# Patient Record
Sex: FEMALE | Race: WHITE | NOT HISPANIC OR LATINO | Employment: UNEMPLOYED | URBAN - METROPOLITAN AREA
[De-identification: names, ages, dates, MRNs, and addresses within clinical notes are randomized per-mention and may not be internally consistent; named-entity substitution may affect disease eponyms.]

---

## 2017-05-24 ENCOUNTER — OFFICE VISIT (OUTPATIENT)
Dept: OBSTETRICS AND GYNECOLOGY | Facility: CLINIC | Age: 52
End: 2017-05-24
Payer: MEDICAID

## 2017-05-24 VITALS
HEIGHT: 72 IN | WEIGHT: 284 LBS | DIASTOLIC BLOOD PRESSURE: 80 MMHG | BODY MASS INDEX: 38.47 KG/M2 | SYSTOLIC BLOOD PRESSURE: 126 MMHG

## 2017-05-24 DIAGNOSIS — N63.10 BREAST MASS, RIGHT: ICD-10-CM

## 2017-05-24 DIAGNOSIS — Z00.00 PREVENTATIVE HEALTH CARE: ICD-10-CM

## 2017-05-24 DIAGNOSIS — Z01.419 ENCOUNTER FOR GYNECOLOGICAL EXAMINATION WITHOUT ABNORMAL FINDING: Primary | ICD-10-CM

## 2017-05-24 PROCEDURE — 99386 PREV VISIT NEW AGE 40-64: CPT | Mod: S$PBB,,, | Performed by: NURSE PRACTITIONER

## 2017-05-24 PROCEDURE — 99999 PR PBB SHADOW E&M-EST. PATIENT-LVL II: CPT | Mod: PBBFAC,,, | Performed by: NURSE PRACTITIONER

## 2017-05-24 PROCEDURE — 99212 OFFICE O/P EST SF 10 MIN: CPT | Mod: PBBFAC | Performed by: NURSE PRACTITIONER

## 2017-05-24 PROCEDURE — 88175 CYTOPATH C/V AUTO FLUID REDO: CPT

## 2017-05-24 RX ORDER — PAROXETINE 10 MG/1
10 TABLET, FILM COATED ORAL
COMMUNITY
Start: 2017-05-15 | End: 2017-05-29

## 2017-05-24 NOTE — PROGRESS NOTES
"CC: Well woman exam    Glendy Veras is a 51 y.o. female  presents for well woman exam. Patient reports cycles were very 26-28 days , heavy. LMP was 03/15/2017. UPT negative. Last pap exam and mammogram were several years ago, normal. Patient reports " finding a right breast mass last year, but did not proceed to provider". No  Nipple discharge or h/o breast cancer in family.    History reviewed. No pertinent past medical history.  Past Surgical History:   Procedure Laterality Date     SECTION      x 2     OVARIAN CYST REMOVAL       Social History     Social History    Marital status:      Spouse name: N/A    Number of children: N/A    Years of education: N/A     Occupational History    Not on file.     Social History Main Topics    Smoking status: Former Smoker    Smokeless tobacco: Not on file    Alcohol use No    Drug use: No    Sexual activity: Yes     Other Topics Concern    Not on file     Social History Narrative    No narrative on file     Family History   Problem Relation Age of Onset    Colon cancer Paternal Grandmother     Colon cancer Maternal Grandfather     Breast cancer Neg Hx     Ovarian cancer Neg Hx      OB History      Para Term  AB Living    3 2 2  1 2    SAB TAB Ectopic Multiple Live Births    1              /80   Ht 6' (1.829 m)   Wt 128.8 kg (284 lb)   BMI 38.52 kg/m²       ROS:  GENERAL: Denies weight gain or weight loss. Feeling well overall.   SKIN: Denies rash or lesions.   HEAD: Denies head injury or headache.   NODES: Denies enlarged lymph nodes.   CHEST: Denies chest pain or shortness of breath.   CARDIOVASCULAR: Denies palpitations or left sided chest pain.   ABDOMEN: No abdominal pain, constipation, diarrhea, nausea, vomiting or rectal bleeding.   URINARY: No frequency, dysuria, hematuria, or burning on urination.  REPRODUCTIVE: See HPI.   BREASTS: HPI  HEMATOLOGIC: No easy bruisability or excessive bleeding. "   MUSCULOSKELETAL: Denies joint pain or swelling.   NEUROLOGIC: Denies syncope or weakness.   PSYCHIATRIC: Denies depression, anxiety or mood swings.    PHYSICAL EXAM:  APPEARANCE: Obese female, in no acute distress.  AFFECT: WNL, alert and oriented x 3  SKIN: No acne or hirsutism  NECK: Neck symmetric without masses or thyromegaly  NODES: No inguinal, cervical, axillary, or femoral lymph node enlargement  CHEST: Good respiratory effect  ABDOMEN: Soft.  No tenderness or masses.   BREASTS: Symmetrical, no skin changes.   No nipple discharge bilaterally.Right breast mass, non tender, 6 to 8 o'clock.  PELVIC: Normal external genitalia without lesions.  Normal hair distribution.  Adequate perineal body, normal urethral meatus.  Vagina moist and well rugated without lesions or discharge.  Cervix pink, without lesions, discharge or tenderness.  No significant cystocele or rectocele.  Bimanual exam shows uterus to be normal size, regular, mobile and nontender.  Adnexa without masses or tenderness.    EXTREMITIES: No edema.    1. Encounter for gynecological examination without abnormal finding  Liquid-based pap smear, screening   2. Preventative health care      PLAN:  DX mammogram  Pap exam  Patient was counseled today on A.C.S. Pap guidelines and recommendations for yearly pelvic exams, mammograms and monthly self breast exams; to see her PCP for other health maintenance.

## 2017-05-25 ENCOUNTER — HOSPITAL ENCOUNTER (OUTPATIENT)
Dept: RADIOLOGY | Facility: HOSPITAL | Age: 52
Discharge: HOME | End: 2017-05-25
Attending: NURSE PRACTITIONER
Payer: MEDICAID

## 2017-05-25 DIAGNOSIS — N63.10 BREAST MASS, RIGHT: ICD-10-CM

## 2017-05-25 PROCEDURE — 77062 BREAST TOMOSYNTHESIS BI: CPT | Mod: 26,,, | Performed by: RADIOLOGY

## 2017-05-25 PROCEDURE — 77066 DX MAMMO INCL CAD BI: CPT | Mod: 26,,, | Performed by: RADIOLOGY

## 2017-05-25 PROCEDURE — 77066 DX MAMMO INCL CAD BI: CPT | Mod: TC

## 2017-05-25 PROCEDURE — 76642 ULTRASOUND BREAST LIMITED: CPT | Mod: 26,RT,, | Performed by: RADIOLOGY

## 2017-05-25 PROCEDURE — 76642 ULTRASOUND BREAST LIMITED: CPT | Mod: TC,PO,RT

## 2017-05-26 ENCOUNTER — TELEPHONE (OUTPATIENT)
Dept: HEMATOLOGY/ONCOLOGY | Facility: CLINIC | Age: 52
End: 2017-05-26

## 2017-05-26 ENCOUNTER — TELEPHONE (OUTPATIENT)
Dept: RADIOLOGY | Facility: HOSPITAL | Age: 52
End: 2017-05-26

## 2017-05-26 NOTE — PROGRESS NOTES
Please call patient and set up appt with general surgery. DX mammogram indicated; Density in the right breast is highly suggestive of malignancy. Biopsy  should be considered.I attempted to call her to discuss these results several times.

## 2017-05-26 NOTE — TELEPHONE ENCOUNTER
Breast Care Management Follow-Up:    Date of Mammogram:05/25/17    Mammogram Reason:Palpable abnormality and pain in the right breast    Mammogram Results:and Right U/S - highly suspicious 25mm solid mass in the right breast. Cat 5.      Referrals/Recommendations:Surgery consult        Patient Status:  05/26/17 Left message for pt to call. Results letter and report mailed to pt.  05/29/17 Pt has an appt with Amelia Gallardo NP today.

## 2017-05-26 NOTE — TELEPHONE ENCOUNTER
----- Message from Herminio Chávez sent at 5/26/2017 11:18 AM CDT -----  Contact: pt  Pt is calling nurse staff regarding questions about patient surgery and if pt can be seen today. . Pt call back [phone] thanks

## 2017-05-29 ENCOUNTER — OFFICE VISIT (OUTPATIENT)
Dept: SURGERY | Facility: CLINIC | Age: 52
End: 2017-05-29
Payer: MEDICAID

## 2017-05-29 VITALS
SYSTOLIC BLOOD PRESSURE: 120 MMHG | TEMPERATURE: 98 F | DIASTOLIC BLOOD PRESSURE: 83 MMHG | WEIGHT: 280.19 LBS | HEART RATE: 78 BPM | BODY MASS INDEX: 38 KG/M2

## 2017-05-29 DIAGNOSIS — R59.0 AXILLARY ADENOPATHY: ICD-10-CM

## 2017-05-29 DIAGNOSIS — R92.8 ABNORMAL MAMMOGRAM: ICD-10-CM

## 2017-05-29 DIAGNOSIS — N63.10 BREAST MASS, RIGHT: Primary | ICD-10-CM

## 2017-05-29 PROCEDURE — 99204 OFFICE O/P NEW MOD 45 MIN: CPT | Mod: S$PBB,,, | Performed by: NURSE PRACTITIONER

## 2017-05-29 PROCEDURE — 99213 OFFICE O/P EST LOW 20 MIN: CPT | Mod: PBBFAC | Performed by: NURSE PRACTITIONER

## 2017-05-29 PROCEDURE — 99999 PR PBB SHADOW E&M-EST. PATIENT-LVL III: CPT | Mod: PBBFAC,,, | Performed by: NURSE PRACTITIONER

## 2017-05-29 NOTE — PROGRESS NOTES
Patient ID: Glendy Veras is a 51 y.o. female.    Chief Complaint: Breast Problem and Abnormal Mammogram      HPI: Patient noted right breast mass 2016 - painful and mobile when first noted. Has increased in size over this time. States had a rash on right arm and breast at the same time of the onset of the lump and spontaneously went away.     Review of Systems   Constitutional: Negative.    HENT: Negative.    Eyes: Negative.    Respiratory: Negative.    Cardiovascular: Negative.    Gastrointestinal: Negative.    Endocrine: Negative.    Genitourinary: Negative.    Musculoskeletal: Negative.    Skin: Negative.    Allergic/Immunologic: Negative.    Neurological: Negative.    Hematological: Negative.    Psychiatric/Behavioral: Negative.        Breast: Pt denies any breast pain, nipple discharge, or palpable mass. No prior trauma or bruising. No breast surgeries or abnormalities.    No current outpatient prescriptions on file.     No current facility-administered medications for this visit.        Review of patient's allergies indicates:  No Known Allergies    History reviewed. No pertinent past medical history.    Past Surgical History:   Procedure Laterality Date     SECTION      x 2     OVARIAN CYST REMOVAL         Family History   Problem Relation Age of Onset    Colon cancer Paternal Grandmother     Colon cancer Maternal Grandfather     Breast cancer Neg Hx     Ovarian cancer Neg Hx        Social History     Social History    Marital status:      Spouse name: N/A    Number of children: N/A    Years of education: N/A     Occupational History    Not on file.     Social History Main Topics    Smoking status: Former Smoker    Smokeless tobacco: Not on file    Alcohol use No    Drug use: No    Sexual activity: Yes     Other Topics Concern    Not on file     Social History Narrative    No narrative on file       Vitals:    17 1018   BP: 120/83   Pulse: 78   Temp: 98.1 °F  (36.7 °C)       Physical Exam   Constitutional: She is oriented to person, place, and time. She appears well-developed and well-nourished.   HENT:   Head: Normocephalic and atraumatic.   Right Ear: External ear normal.   Left Ear: External ear normal.   Eyes: Conjunctivae and EOM are normal. Pupils are equal, round, and reactive to light. Right eye exhibits no discharge. Left eye exhibits no discharge. No scleral icterus.   Neck: Normal range of motion. Neck supple. No thyromegaly present.   Cardiovascular: Normal rate and regular rhythm.    Pulmonary/Chest: Effort normal and breath sounds normal. Right breast exhibits mass (4 cm irregular mass at the 9 oclock region) and tenderness. Right breast exhibits no inverted nipple, no nipple discharge and no skin change. Left breast exhibits no inverted nipple, no mass, no nipple discharge, no skin change and no tenderness.       Musculoskeletal: Normal range of motion.   Lymphadenopathy:        Head (right side): No submental, no submandibular, no tonsillar, no preauricular, no posterior auricular and no occipital adenopathy present.        Head (left side): No submental, no submandibular, no tonsillar, no preauricular, no posterior auricular and no occipital adenopathy present.     She has no cervical adenopathy.        Right cervical: No superficial cervical and no posterior cervical adenopathy present.       Left cervical: No superficial cervical and no posterior cervical adenopathy present.     She has no axillary adenopathy.        Right: No supraclavicular adenopathy present.        Left: No supraclavicular adenopathy present.   Neurological: She is alert and oriented to person, place, and time.   Skin: Skin is warm and dry.   Psychiatric: She has a normal mood and affect. Her behavior is normal. Judgment and thought content normal.       Imagin17:  MAMMO DIGITAL DIAGNOSTIC BILAT WITH TOMOSYNTHESIS_CAD  Views: bilateral craniocaudal with tomosynthesis;  bilateral mediolateral  oblique with tomosynthesis; right craniocaudal spot compression; right  mediolateral with tomosynthesis; right mediolateral oblique spot compression    The breasts are heterogeneously dense.  This may lower the sensitivity of  mammography.    There is a high density density measuring 25 millimeters with spiculated  margins and associated architectural distortion seen in the posterior upper  outer quadrant of the right breast located 8 centimeters from the nipple.  This correlates with plapable abnormality and skin marker in place.  No  additional evidence of dominant mass, architectural distortion or  suspicious calcification.  Bilateral benign appearing calcifications are  again demonstrated.  Bilateral benign appearing scattered fibroglandular  nodular densities again noted.  Digital tomosynthesis was performed and used in the interpretation of the  images.  Images were evaluated with a Computer Aided Detection (CAD) system.    RIGHT LIMITED ULTRASOUND FINDINGS:  Targeted imaging performed  demonstrating a large irregularly marginated  hypoechoic mass  measuring 2.1 x 1.3 x 1.6 cm with minimal posterior  shadowing.  This correlates well with the recent mammogram findings.  Impression  Density in the right breast is highly suggestive of malignancy. Biopsy  should be considered. Appropriate action should be taken.    BI-RADS Category 5: Highly Suggestive of Malignancy    Assessment & Plan:  Breast mass, right  -     US Breast Biopsy with Imaging 1st site R; Future; Expected date: 05/29/2017  -     US Breast Right Limited; Future; Expected date: 05/29/2017  -     US Breast Biopsy with Imaging 1st site R; Future; Expected date: 05/29/2017    Abnormal mammogram  -     US Breast Biopsy with Imaging 1st site R; Future; Expected date: 05/29/2017  -     US Breast Right Limited; Future; Expected date: 05/29/2017  -     US Breast Biopsy with Imaging 1st site R; Future; Expected date:  05/29/2017    Axillary adenopathy  -     US Breast Right Limited; Future; Expected date: 05/29/2017  -     US Breast Biopsy with Imaging 1st site R; Future; Expected date: 05/29/2017    Palpable right breast mass for 7 mons enlarging  Recommend ultrasound guided core needle biopsy of the palpable mass since it is larger clinically than on imaging.   Explained to the pt and her  the suspicious nature of the clinical and imaging findings.   Recommend ultrasound guided core biopsy of the right breast palpable mass and an ultrasound of the right axilla to rule out adenopathy and if noted recommend biopsy.  Explained to pt the results usually take about 7 days to get back and we would schedule her to see a surgeon and oncologist on the same day to discuss management of her case together. Explained that treatment may consist of doing neoadjuvant chemotherapy first then surgery. Pt verbalized understanding. Allowed her and her  opportunity to ask questions.

## 2017-06-14 ENCOUNTER — DOCUMENTATION ONLY (OUTPATIENT)
Dept: HEMATOLOGY/ONCOLOGY | Facility: CLINIC | Age: 52
End: 2017-06-14

## 2017-06-14 NOTE — PROGRESS NOTES
Notified pt of core biopsy results of the right breast performed at [facility].    Right axillary ultrasound did not reveal any suspicious axillary node.    Right breast mass at 9 oclock biopsy reveals invasive mammary carcinoma of no special type. Estrogen/progesterone positive and herceptin negative.     Explained results to pt - she has an appt to see Dr. Ribeiro already for 6/19/17 to discuss surgical intervention.     Pt verbalized understanding of the information given. Discussed potential course of events for pt in the next few weeks. Pt verbalized understanding and states her  will be coming with her to the appt on Monday.

## 2017-06-15 ENCOUNTER — TELEPHONE (OUTPATIENT)
Dept: HEMATOLOGY/ONCOLOGY | Facility: CLINIC | Age: 52
End: 2017-06-15

## 2017-06-15 ENCOUNTER — PATIENT MESSAGE (OUTPATIENT)
Dept: SURGERY | Facility: CLINIC | Age: 52
End: 2017-06-15

## 2017-06-15 NOTE — TELEPHONE ENCOUNTER
----- Message from Nakita Carrizales sent at 6/15/2017 10:43 AM CDT -----  Contact: pt   Pt was told by isabella that she could call and  a pathology report,,, pt would like to come by and get report,,, please call pt back at [phone]

## 2017-06-16 ENCOUNTER — TELEPHONE (OUTPATIENT)
Dept: SURGERY | Facility: HOSPITAL | Age: 52
End: 2017-06-16

## 2017-06-19 ENCOUNTER — OFFICE VISIT (OUTPATIENT)
Dept: SURGERY | Facility: CLINIC | Age: 52
End: 2017-06-19
Payer: MEDICAID

## 2017-06-19 VITALS
DIASTOLIC BLOOD PRESSURE: 79 MMHG | TEMPERATURE: 99 F | HEART RATE: 81 BPM | WEIGHT: 281.31 LBS | SYSTOLIC BLOOD PRESSURE: 125 MMHG | BODY MASS INDEX: 38.15 KG/M2

## 2017-06-19 DIAGNOSIS — C50.911 MALIGNANT NEOPLASM OF RIGHT FEMALE BREAST, UNSPECIFIED SITE OF BREAST: Primary | ICD-10-CM

## 2017-06-19 PROCEDURE — 99999 PR PBB SHADOW E&M-EST. PATIENT-LVL II: CPT | Mod: PBBFAC,,, | Performed by: SURGERY

## 2017-06-19 PROCEDURE — 99214 OFFICE O/P EST MOD 30 MIN: CPT | Mod: S$PBB,,, | Performed by: SURGERY

## 2017-06-19 PROCEDURE — 99212 OFFICE O/P EST SF 10 MIN: CPT | Mod: PBBFAC | Performed by: SURGERY

## 2017-06-19 RX ORDER — ACETAMINOPHEN 325 MG/1
325 TABLET ORAL EVERY 6 HOURS PRN
COMMUNITY

## 2017-06-19 NOTE — PROGRESS NOTES
Patient ID: Glendy Veras is a 51 y.o. female.    Right breast cancer    Chief Complaint: Consult      HPI:  Patient noticed a lump in her right breast.  In August of last year.  The mass would come and go.  It became more prominent/enlarged over time.  She was evaluated with mammography and ultrasound and underwent a core biopsy.  Results of this showed a ductal carcinoma.  She presents now to discuss surgical intervention    Patient states that she had breast imaging done in  at [facility].  She is concerned that this tumor/cancer was present on those images.    Unfortunately we do not have the images available for comparison.        Review of Systems   Constitutional: Positive for activity change. Negative for unexpected weight change.   HENT: Negative for hearing loss, rhinorrhea and trouble swallowing.    Eyes: Negative for discharge and visual disturbance.   Respiratory: Negative for chest tightness and wheezing.    Cardiovascular: Negative for chest pain and palpitations.   Gastrointestinal: Negative for blood in stool, constipation, diarrhea and vomiting.   Endocrine: Negative for polydipsia and polyuria.   Genitourinary: Positive for menstrual problem. Negative for difficulty urinating, dysuria and hematuria.   Musculoskeletal: Positive for arthralgias. Negative for joint swelling and neck pain.   Neurological: Positive for weakness. Negative for headaches.   Psychiatric/Behavioral: Positive for dysphoric mood. Negative for confusion.       No current outpatient prescriptions on file.     No current facility-administered medications for this visit.        Review of patient's allergies indicates:  No Known Allergies    No past medical history on file.    Past Surgical History:   Procedure Laterality Date     SECTION      x 2     OVARIAN CYST REMOVAL         Family History   Problem Relation Age of Onset    Colon cancer Paternal Grandmother     Colon cancer Maternal Grandfather      Breast cancer Neg Hx     Ovarian cancer Neg Hx        Social History     Social History    Marital status:      Spouse name: N/A    Number of children: N/A    Years of education: N/A     Occupational History    Not on file.     Social History Main Topics    Smoking status: Former Smoker    Smokeless tobacco: Not on file    Alcohol use No    Drug use: No    Sexual activity: Yes     Other Topics Concern    Not on file     Social History Narrative    No narrative on file       There were no vitals filed for this visit.    Physical Exam   Constitutional: She is oriented to person, place, and time. She appears well-developed and well-nourished.   HENT:   Head: Normocephalic.   Eyes: EOM are normal. Pupils are equal, round, and reactive to light.   Neck: No JVD present. No tracheal deviation present. No thyromegaly present.   Cardiovascular: Normal rate, regular rhythm and normal heart sounds.    Pulmonary/Chest: Breath sounds normal. She has no wheezes.   Abdominal: Soft. Bowel sounds are normal. She exhibits no distension and no mass. There is no hepatosplenomegaly. There is no tenderness. There is no rigidity, no rebound and no guarding.   Musculoskeletal: Normal range of motion. She exhibits no edema.   Lymphadenopathy:     She has no cervical adenopathy.   Neurological: She is oriented to person, place, and time.   Skin: Skin is warm and dry. No rash noted. No erythema.   Bilateral breast exam showed no skin changes, masses, nipple discharges, nor axillary adenopathy on the left.    On the right side is an fullness in the mid lateral breast quadrant.  There are no other masses.  There are no skin changes.  There is no nipple discharge and is no discrete axillary adenopathy but there is a fullness of the lower axilla   Psychiatric: She has a normal mood and affect.     Biopsy reports were reviewed.  Imaging reports, mammogram and ultrasound as well as axillary ultrasound, were reviewed.  The  axillary ultrasound on the right did not show any concerning adenopathy    Assessment & Plan:    infiltrating ductal carcinoma of the right breast with positive estrogen and progesterone receptors.  HER-2/ct was negative.    Right lumpectomy, sentinel node biopsy, axillary dissection only if 3 lymph nodes are positive or extracapsular invasion is present.    I discussed the rationale for lumpectomy as well as sentinel node biopsy.  I discussed the possible need for axillary dissection.  I compared and contrasted lumpectomy with radiation to mastectomy with and without reconstruction.    I explained to the patient that the need for chemotherapy is yet to be determined.  I explained that she would likely need estrogen blockade with either a Arimidex or tamoxifen per her oncologist.  I explained the need for radiation after lumpectomy.    I explained that there is a possibility she would need to return to the operating room for additional surgery once the final pathology results are available      The patient was very concerned about whether this cancer was present on mammograms done in 2012 at [facility].  She had multiple questions about postoperative cosmesis and what could be done if she was unhappy with the cosmetic results.  She was concerned about the possibility of metastatic disease.    All questions were answered.  Approximately 45 minutes was spent with the patient and her       The patient would need lymphoscintigraphy and needle localization prior to surgery

## 2017-06-19 NOTE — PATIENT INSTRUCTIONS
You have a breast cancer that's approximately 2 cm in size.    I would recommend a lumpectomy, sentinel node biopsy and removal of the lymph nodes from the armpit only if medically necessary.    The only reason to do a mastectomy, at this point, is that you would like to avoid radiation treatment.    If you decide that you wish to undergo a mastectomy and are interested in reconstruction please let us know as soon as possible so that we can make arrangements for you to see a plastic surgeon.      Please think about what surgery he would like to do, I would like to see you back next week to schedule surgery.  I do surgery on Tuesdays, Wednesdays and Fridays

## 2018-09-07 ENCOUNTER — TELEPHONE (OUTPATIENT)
Dept: OBSTETRICS AND GYNECOLOGY | Facility: CLINIC | Age: 53
End: 2018-09-07

## 2018-09-07 NOTE — TELEPHONE ENCOUNTER
Spoke to patient, assisted with scheduling annual appointment with Lucero Leblanc NP on 10/2/18. Patient verbalized understanding.

## 2018-09-07 NOTE — TELEPHONE ENCOUNTER
----- Message from Glendy iSngh sent at 9/7/2018  1:13 PM CDT -----  Contact: self [phone]  States that she needs to be worked in for her well woman visit. Pt is established medicaid. Please call back at [phone]//thank you acc

## 2019-02-01 ENCOUNTER — TELEPHONE (OUTPATIENT)
Dept: OBSTETRICS AND GYNECOLOGY | Facility: CLINIC | Age: 54
End: 2019-02-01

## 2019-02-01 NOTE — TELEPHONE ENCOUNTER
----- Message from Calvin Jacinto sent at 2/1/2019  2:02 PM CST -----  Contact: self [phone]  Would like to be worked in for an annual well woman visit with Lucero Leblanc before April.  Please call back at [phone].  Md Analilia

## 2019-02-01 NOTE — TELEPHONE ENCOUNTER
Spoke to patient and scheduled her annual appointment for 02/07/19 at 1:15pm at the [facility] location to see DILLON Barker. Patient verbalized understanding.

## 2019-02-07 ENCOUNTER — TELEPHONE (OUTPATIENT)
Dept: OBSTETRICS AND GYNECOLOGY | Facility: CLINIC | Age: 54
End: 2019-02-07

## 2019-02-07 ENCOUNTER — OFFICE VISIT (OUTPATIENT)
Dept: OBSTETRICS AND GYNECOLOGY | Facility: CLINIC | Age: 54
End: 2019-02-07
Payer: MEDICAID

## 2019-02-07 VITALS
WEIGHT: 287.06 LBS | HEIGHT: 72 IN | DIASTOLIC BLOOD PRESSURE: 72 MMHG | SYSTOLIC BLOOD PRESSURE: 110 MMHG | BODY MASS INDEX: 38.88 KG/M2

## 2019-02-07 DIAGNOSIS — Z00.00 PREVENTATIVE HEALTH CARE: ICD-10-CM

## 2019-02-07 DIAGNOSIS — R10.2 PELVIC PAIN IN FEMALE: Primary | ICD-10-CM

## 2019-02-07 DIAGNOSIS — Z01.419 CERVICAL SMEAR, AS PART OF ROUTINE GYNECOLOGICAL EXAMINATION: ICD-10-CM

## 2019-02-07 PROCEDURE — 99212 OFFICE O/P EST SF 10 MIN: CPT | Mod: PBBFAC | Performed by: NURSE PRACTITIONER

## 2019-02-07 PROCEDURE — 87186 SC STD MICRODIL/AGAR DIL: CPT

## 2019-02-07 PROCEDURE — 99396 PR PREVENTIVE VISIT,EST,40-64: ICD-10-PCS | Mod: S$PBB,,, | Performed by: NURSE PRACTITIONER

## 2019-02-07 PROCEDURE — 87077 CULTURE AEROBIC IDENTIFY: CPT

## 2019-02-07 PROCEDURE — 88175 CYTOPATH C/V AUTO FLUID REDO: CPT

## 2019-02-07 PROCEDURE — 99999 PR PBB SHADOW E&M-EST. PATIENT-LVL II: CPT | Mod: PBBFAC,,, | Performed by: NURSE PRACTITIONER

## 2019-02-07 PROCEDURE — 99999 PR PBB SHADOW E&M-EST. PATIENT-LVL II: ICD-10-PCS | Mod: PBBFAC,,, | Performed by: NURSE PRACTITIONER

## 2019-02-07 PROCEDURE — 99396 PREV VISIT EST AGE 40-64: CPT | Mod: S$PBB,,, | Performed by: NURSE PRACTITIONER

## 2019-02-07 PROCEDURE — 87624 HPV HI-RISK TYP POOLED RSLT: CPT

## 2019-02-07 PROCEDURE — 87086 URINE CULTURE/COLONY COUNT: CPT

## 2019-02-07 PROCEDURE — 87088 URINE BACTERIA CULTURE: CPT

## 2019-02-07 RX ORDER — VENLAFAXINE HYDROCHLORIDE 75 MG/1
CAPSULE, EXTENDED RELEASE ORAL
COMMUNITY
Start: 2019-01-16

## 2019-02-07 RX ORDER — ONDANSETRON 4 MG/1
TABLET, ORALLY DISINTEGRATING ORAL
COMMUNITY
Start: 2018-04-19

## 2019-02-07 RX ORDER — LORAZEPAM 0.5 MG/1
TABLET ORAL
COMMUNITY

## 2019-02-07 RX ORDER — WARFARIN SODIUM 5 MG/1
TABLET ORAL
COMMUNITY
Start: 2018-12-20

## 2019-02-07 RX ORDER — GABAPENTIN 300 MG/1
CAPSULE ORAL 3 TIMES DAILY
COMMUNITY
Start: 2019-02-03

## 2019-02-07 RX ORDER — LORATADINE 10 MG/1
10 TABLET ORAL DAILY
COMMUNITY

## 2019-02-07 NOTE — PROGRESS NOTES
"CC: Well woman exam    Glendy Veras is a 53 y.o. female  presents for well woman exam.  LMP: . No AUB. Patient reports that she has had pelvic pain for 4 months. Urine in clinic indicated leucocytes. No hematuria. Patient reports vaginal dryness, with intercourse. Last pap exam was normal. Patient is s/p oophorectomy. Patient is being followed for stage III breast cancer (OLOL) Patient is concerned that " her oncology is not taking her seriously and wants a second opinion". Patient states that she " drinks 6-7 glasses of water/day and urine has a strong odor".     Past Medical History:   Diagnosis Date    Anemia     Breast cancer      Past Surgical History:   Procedure Laterality Date     SECTION      x 2     OOPHORECTOMY      OVARIAN CYST REMOVAL      TONSILLECTOMY       Social History     Socioeconomic History    Marital status:      Spouse name: Not on file    Number of children: Not on file    Years of education: Not on file    Highest education level: Not on file   Social Needs    Financial resource strain: Not on file    Food insecurity - worry: Not on file    Food insecurity - inability: Not on file    Transportation needs - medical: Not on file    Transportation needs - non-medical: Not on file   Occupational History    Not on file   Tobacco Use    Smoking status: Former Smoker    Smokeless tobacco: Former User   Substance and Sexual Activity    Alcohol use: No    Drug use: No    Sexual activity: Yes     Partners: Male   Other Topics Concern    Not on file   Social History Narrative    Not on file     Family History   Problem Relation Age of Onset    Colon cancer Paternal Grandmother     Colon cancer Maternal Grandfather     Breast cancer Neg Hx     Ovarian cancer Neg Hx      OB History      Para Term  AB Living    3 2 2   1 2    SAB TAB Ectopic Multiple Live Births    1                  /72 (BP Location: Left arm, Patient Position: " "Sitting, BP Method: Medium (Manual))   Ht 6' 1" (1.854 m)   Wt 130.2 kg (287 lb 0.6 oz)   BMI 37.87 kg/m²       ROS:  GENERAL: Denies weight gain or weight loss. Feeling well overall.   SKIN: Denies rash or lesions.   HEAD: Denies head injury or headache.   NODES: Denies enlarged lymph nodes.   CHEST: Denies chest pain or shortness of breath.   CARDIOVASCULAR: Denies palpitations or left sided chest pain.   URINARY: HPI  ABDOMEN:HPI  REPRODUCTIVE: See HPI.   BREASTS: HPI  HEMATOLOGIC: No easy bruisability or excessive bleeding.   MUSCULOSKELETAL: Denies joint pain or swelling.   NEUROLOGIC: Denies syncope or weakness.   PSYCHIATRIC: Denies depression, anxiety or mood swings.    PHYSICAL EXAM:  APPEARANCE: Obese female, in no acute distress.  AFFECT: WNL, alert and oriented x 3  SKIN: No acne or hirsutism  NECK: Neck symmetric without masses or thyromegaly  NODES: No inguinal, cervical, axillary, or femoral lymph node enlargement  CHEST: Good respiratory effect  ABDOMEN: Soft.  No tenderness or masses.  No hepatosplenomegaly.  No hernias.  BREASTS: Deferred  PELVIC: Normal external genitalia without lesions.  Normal hair distribution.  Adequate perineal body, normal urethral meatus.  Vagina atrophy,without lesions or discharge.  Cervix pink, without lesions, discharge or tenderness.  No significant cystocele or rectocele.  Bimanual exam shows uterus to be normal size, regular, mobile and nontender.  Adnexa without masses or tenderness.    EXTREMITIES: No edema.    1. Pelvic pain in female  Urine culture    US Pelvis Complete Non OB   2. Preventative health care  Liquid-based pap smear, screening    HPV High Risk Genotypes, PCR   3. Cervical smear, as part of routine gynecological examination  Liquid-based pap smear, screening    HPV High Risk Genotypes, PCR     PLAN:  Patient will be referred to VIRGILIO Gallardo NP   Referral to Urology  Urine sent for cx  Pap exam  Pelvic ultrasound           "

## 2019-02-07 NOTE — TELEPHONE ENCOUNTER
----- Message from Chantelle Gonzales LPN sent at 2/7/2019  2:27 PM CST -----  For what?  ----- Message -----  From: Dylan Kruse MA  Sent: 2/7/2019   2:09 PM  To: Sami BUTTS Staff    Good Evening,    Lucero Leblanc is wanting this patient to f/u with Amelia. Patient prefers an afternoon appt.

## 2019-02-08 ENCOUNTER — TELEPHONE (OUTPATIENT)
Dept: OBSTETRICS AND GYNECOLOGY | Facility: CLINIC | Age: 54
End: 2019-02-08

## 2019-02-08 NOTE — TELEPHONE ENCOUNTER
Spoke with pt, informed that after speaking with our Urology Dept, they we are referring her to [facility] for further evaluation. Advised that she will receive a phone call from them to schedule an appt. Pt voiced understanding.

## 2019-02-11 LAB — BACTERIA UR CULT: NORMAL

## 2019-02-11 RX ORDER — NITROFURANTOIN 25; 75 MG/1; MG/1
100 CAPSULE ORAL 2 TIMES DAILY
Qty: 14 CAPSULE | Refills: 0 | Status: SHIPPED | OUTPATIENT
Start: 2019-02-11 | End: 2019-02-13

## 2019-02-12 LAB
HPV HR 12 DNA CVX QL NAA+PROBE: POSITIVE
HPV16 AG SPEC QL: NEGATIVE
HPV18 DNA SPEC QL NAA+PROBE: NEGATIVE

## 2019-02-13 ENCOUNTER — TELEPHONE (OUTPATIENT)
Dept: OBSTETRICS AND GYNECOLOGY | Facility: CLINIC | Age: 54
End: 2019-02-13

## 2019-02-13 RX ORDER — SULFAMETHOXAZOLE AND TRIMETHOPRIM 800; 160 MG/1; MG/1
1 TABLET ORAL 2 TIMES DAILY
Qty: 14 TABLET | Refills: 0 | Status: SHIPPED | OUTPATIENT
Start: 2019-02-13 | End: 2019-02-20

## 2019-02-13 NOTE — PROGRESS NOTES
Please call patient and inform her that pap cytology was negative. HPV co testing was positive. She will need to repeat the pap exam in 12 months.

## 2019-02-13 NOTE — TELEPHONE ENCOUNTER
----- Message from Karsten Colunga sent at 2/13/2019 12:45 PM CST -----  Contact: [facility]  Type:  Pharmacy Calling to Clarify an RX    Name of Caller:[facility]  [facility]  Prescription Name:warfarin  What do they need to clarify?:drug to drug interaction with rx that was prescribed today for pt   Best Call Back Number:[phone]  Additional Information: none      Thanks,  Karsten Colunga

## 2019-02-13 NOTE — TELEPHONE ENCOUNTER
Spoke with Adia at pharmacy, she spoke with patient and patient is not currently taking coumadin.  Pharmacy will consult patient.

## 2019-02-13 NOTE — TELEPHONE ENCOUNTER
Pt called stating she was unaware of urine culture results until viewing via [employer]. Prefers calls for any future results. States she is on day 3 of Macrobid but does not feel better, urine is foamy and now having bladder spasms. Please advise.

## 2019-02-13 NOTE — TELEPHONE ENCOUNTER
R/s pelvic ultrasound on 2/20/19 1:15 PM.Prep instructions given. Patient verbalized understanding

## 2019-02-13 NOTE — TELEPHONE ENCOUNTER
----- Message from Lucero Leblanc NP sent at 2/13/2019  3:51 PM CST -----  Please call patient and inform her that pap cytology was negative. HPV co testing was positive. She will need to repeat the pap exam in 12 months.

## 2019-02-13 NOTE — TELEPHONE ENCOUNTER
Spoke with patient  Results given for pap and HPV co test. the patient may need a phone call from you for further explanation.

## 2019-02-13 NOTE — TELEPHONE ENCOUNTER
----- Message from Kassy Zelaya sent at 2/13/2019 10:51 AM CST -----  Patient needs consult with nurse alondra beltran..[phone] (home)

## 2019-02-20 ENCOUNTER — TELEPHONE (OUTPATIENT)
Dept: OBSTETRICS AND GYNECOLOGY | Facility: CLINIC | Age: 54
End: 2019-02-20

## 2019-02-20 ENCOUNTER — APPOINTMENT (OUTPATIENT)
Dept: RADIOLOGY | Facility: HOSPITAL | Age: 54
End: 2019-02-20
Attending: NURSE PRACTITIONER
Payer: MEDICAID

## 2019-02-20 DIAGNOSIS — R10.2 PELVIC PAIN IN FEMALE: ICD-10-CM

## 2019-02-20 PROBLEM — C80.1 NEUROPATHY ASSOCIATED WITH CANCER: Status: ACTIVE | Noted: 2019-02-20

## 2019-02-20 PROBLEM — Z17.0 MALIGNANT NEOPLASM OF UPPER-OUTER QUADRANT OF RIGHT BREAST IN FEMALE, ESTROGEN RECEPTOR POSITIVE: Status: ACTIVE | Noted: 2019-02-20

## 2019-02-20 PROBLEM — F41.9 ANXIETY: Status: ACTIVE | Noted: 2019-02-20

## 2019-02-20 PROBLEM — C50.411 MALIGNANT NEOPLASM OF UPPER-OUTER QUADRANT OF RIGHT BREAST IN FEMALE, ESTROGEN RECEPTOR POSITIVE: Status: ACTIVE | Noted: 2019-02-20

## 2019-02-20 PROBLEM — G63 NEUROPATHY ASSOCIATED WITH CANCER: Status: ACTIVE | Noted: 2019-02-20

## 2019-02-20 PROCEDURE — 76856 US EXAM PELVIC COMPLETE: CPT | Mod: 26,,, | Performed by: RADIOLOGY

## 2019-02-20 PROCEDURE — 76856 US PELVIS COMP WITH TRANSVAG NON-OB (XPD): ICD-10-PCS | Mod: 26,,, | Performed by: RADIOLOGY

## 2019-02-20 PROCEDURE — 76830 TRANSVAGINAL US NON-OB: CPT | Mod: TC,PO

## 2019-02-20 PROCEDURE — 76830 US PELVIS COMP WITH TRANSVAG NON-OB (XPD): ICD-10-PCS | Mod: 26,,, | Performed by: RADIOLOGY

## 2019-02-20 PROCEDURE — 76830 TRANSVAGINAL US NON-OB: CPT | Mod: 26,,, | Performed by: RADIOLOGY

## 2019-02-20 NOTE — TELEPHONE ENCOUNTER
----- Message from Ximena Cortez sent at 2/20/2019  4:09 PM CST -----  Contact: patient  Type:  Test Results    Who Called: patient  Name of Test (Lab/Mammo/Etc):  results  Date of Test: [id number]  Ordering Provider: [facility]  [facility]  Would the patient rather a call back or a response via [facility]? Call  Best Call Back Number: [phone]  Additional Information:  Thanks, lucho

## 2019-02-20 NOTE — TELEPHONE ENCOUNTER
Spoke with patient  Notified of normal pelvic ultrasound results. Patient verbalized understanding.

## 2019-02-20 NOTE — TELEPHONE ENCOUNTER
----- Message from Lucero Leblanc NP sent at 2/20/2019  2:47 PM CST -----  Please call patient and inform pelvic ultrasound is normal.

## 2019-04-18 ENCOUNTER — TELEPHONE (OUTPATIENT)
Dept: HEMATOLOGY/ONCOLOGY | Facility: CLINIC | Age: 54
End: 2019-04-18

## 2019-04-18 NOTE — TELEPHONE ENCOUNTER
----- Message from Glendy Jimmatthias sent at 4/18/2019  3:09 PM CDT -----  Contact: self [phone]  Type:  Sooner Apoointment Request    Caller is requesting a sooner appointment.  Caller declined first available appointment listed below.  Caller will not accept being placed on the waitlist and is requesting a message be sent to doctor.  Name of Caller:Glendy Veras  When is the first available appointment? Not appt avail  Symptoms:breast cancer  Would the patient rather a call back or a response via [facility]? Call back   Best Call Back Number:[phone]  Additional Information: pt is being referred by Lucero Leblanc. Pt is a new medicaid pt.

## 2021-05-10 ENCOUNTER — PATIENT MESSAGE (OUTPATIENT)
Dept: RESEARCH | Facility: HOSPITAL | Age: 56
End: 2021-05-10